# Patient Record
Sex: MALE | Race: WHITE | NOT HISPANIC OR LATINO | Employment: FULL TIME | URBAN - METROPOLITAN AREA
[De-identification: names, ages, dates, MRNs, and addresses within clinical notes are randomized per-mention and may not be internally consistent; named-entity substitution may affect disease eponyms.]

---

## 2021-08-16 ENCOUNTER — OFFICE VISIT (OUTPATIENT)
Dept: FAMILY MEDICINE CLINIC | Facility: CLINIC | Age: 26
End: 2021-08-16

## 2021-08-16 VITALS
WEIGHT: 124.2 LBS | RESPIRATION RATE: 12 BRPM | BODY MASS INDEX: 15.13 KG/M2 | DIASTOLIC BLOOD PRESSURE: 90 MMHG | SYSTOLIC BLOOD PRESSURE: 146 MMHG | OXYGEN SATURATION: 99 % | HEIGHT: 76 IN | TEMPERATURE: 98.7 F | HEART RATE: 85 BPM

## 2021-08-16 DIAGNOSIS — L72.3 SEBACEOUS CYST: ICD-10-CM

## 2021-08-16 DIAGNOSIS — N50.89 TESTICULAR MASS: ICD-10-CM

## 2021-08-16 DIAGNOSIS — S83.004A DISLOCATION OF RIGHT PATELLA, INITIAL ENCOUNTER: ICD-10-CM

## 2021-08-16 DIAGNOSIS — N52.9 ERECTILE DYSFUNCTION, UNSPECIFIED ERECTILE DYSFUNCTION TYPE: Primary | ICD-10-CM

## 2021-08-16 DIAGNOSIS — K62.5 RECTAL BLEEDING: ICD-10-CM

## 2021-08-16 PROCEDURE — 99204 OFFICE O/P NEW MOD 45 MIN: CPT | Performed by: PHYSICIAN ASSISTANT

## 2021-08-16 RX ORDER — SILDENAFIL 100 MG/1
50-100 TABLET, FILM COATED ORAL DAILY PRN
Qty: 30 TABLET | Refills: 0 | Status: SHIPPED | OUTPATIENT
Start: 2021-08-16

## 2021-08-16 NOTE — PROGRESS NOTES
Chief Complaint   Patient presents with   • Testicle Pain     No pain but there is a lump   • Sore Throat     Tonsilitits concerns   • Erectile Dysfunction       HPI     Jacob Trevizo is a pleasant 26 y.o. male who presents for initial visit.     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 He does not have a PCP.  He noticed lump below left testicle last week. Denies pain or change in size. Some tenderness to the touch. Denies dysuria, urethral discharge. New sexual partner 3 months ago, unprotected. Denies hx of STIs or prior partners. Denies family hx testicular cancer. Has difficulty maintaining erection which has been chronic.     He reports having anal protrusion after BM, sometimes bleeding that typically resolves after pushing back in. Denies rectal pain. Denies constipation, straining. No family hx colon/rectal cancer, colon polyps. Chronic problem for 12-13 years.     Lump on back present for 8-10 yrs. Has not changed in size and is not painful.     Dislocated right kneecap last year. It has happened a couple times when he was a teenager.     BP elevated today. Nervous about coming in. Unaware of Fhx or personal hx hypertension.     Occasional beer. No drug use or significant tobacco use.   3 brothers - healthy. Father/mother - unknown.   No surgeries.     History reviewed. No pertinent past medical history.    History reviewed. No pertinent surgical history.    History reviewed. No pertinent family history.    Social History     Socioeconomic History   • Marital status: Single     Spouse name: Not on file   • Number of children: Not on file   • Years of education: Not on file   • Highest education level: Not on file   Tobacco Use   • Smoking status: Former Smoker     Types: Cigarettes     Quit date: 8/16/2018     Years since quitting: 3.0   • Smokeless tobacco: Never Used   Vaping Use   • Vaping Use: Never used   Substance and Sexual Activity   • Alcohol use: Yes      Alcohol/week: 1.0 standard drinks     Types: 1 Cans of beer per week   • Drug use: Never   • Sexual activity: Yes       No Known Allergies    ROS    Review of Systems   Constitutional: Negative for appetite change, chills, diaphoresis, fatigue, fever and unexpected weight loss.   Eyes: Negative for blurred vision and visual disturbance.   Respiratory: Negative for shortness of breath.    Cardiovascular: Negative for chest pain.   Gastrointestinal: Positive for anal bleeding. Negative for abdominal pain, constipation, diarrhea and rectal pain.   Endocrine: Negative for polydipsia and polyuria.   Genitourinary: Positive for erectile dysfunction and testicular pain. Negative for dysuria, genital sores and scrotal swelling.   Musculoskeletal: Positive for arthralgias.   Skin: Positive for skin lesions.   Neurological: Negative for dizziness and headache.   Psychiatric/Behavioral: Negative for sleep disturbance, suicidal ideas and depressed mood. The patient is not nervous/anxious.        Vitals:    08/16/21 1447   BP: 146/90   Pulse: 85   Resp: 12   Temp: 98.7 °F (37.1 °C)   SpO2: 99%     Body mass index is 15.12 kg/m².      Current Outpatient Medications:   •  sildenafil (Viagra) 100 MG tablet, Take 0.5-1 tablets by mouth Daily As Needed for Erectile Dysfunction. Take 1 hour prior to intercourse, Disp: 30 tablet, Rfl: 0    PE    Physical Exam  Vitals reviewed.   Constitutional:       General: He is not in acute distress.     Appearance: He is well-developed.   HENT:      Head: Normocephalic and atraumatic.   Eyes:      Conjunctiva/sclera: Conjunctivae normal.   Cardiovascular:      Rate and Rhythm: Normal rate and regular rhythm.      Heart sounds: Normal heart sounds. No murmur heard.     Pulmonary:      Effort: Pulmonary effort is normal.      Breath sounds: Normal breath sounds.   Genitourinary:     Penis: Circumcised.       Testes:         Right: Mass or tenderness not present.         Left: Mass or tenderness not  present.      Comments: Pea sized firm nodule associated with left lower epididymis, nontender. No testicular mass palpated  Musculoskeletal:      Cervical back: Normal range of motion.      Right knee: Normal. No deformity or effusion. Normal range of motion. No tenderness.   Skin:     General: Skin is warm and dry.          Neurological:      Mental Status: He is alert.      Gait: Gait normal.   Psychiatric:         Speech: Speech normal.         Behavior: Behavior normal.          A/P    Problem List Items Addressed This Visit     None      Visit Diagnoses     Erectile dysfunction, unspecified erectile dysfunction type    -  Primary  -Order testosterone, trial sildenafil    Relevant Orders    Testosterone    Testicular mass      -Left epididymal mass, most likely cyst, will order ultrasound for further evaluation  -Counseled patient to call or return if this increases in size or worsens    Relevant Orders    US Scrotum & Testicles    Dislocation of right patella, initial encounter      -Home exercises provided  -Declines physical therapy at this time      Rectal bleeding      -Counseled patient on need for colonoscopy and colorectal surgery follow-up  -He declines at this time due to having no insurance.  Recommended patient notify me when he has insurance in place for referral      Sebaceous cyst      -Reassured benign, not inflamed          Plan of care was reviewed with patient at the conclusion of today's visit. Education was provided regarding diagnoses, management, prescribed or recommended OTC products, and the importance of compliance with follow-up appointments. The patient was counseled regarding the risks, benefits, and possible side-effects of treatment. I advised the patient to keep me informed of any acute changes in their status including new, worsening, or persistent symptoms. Patient expresses understanding and agreement with the management plan.        BRONSON Mayorga

## 2021-08-16 NOTE — PATIENT INSTRUCTIONS
Patellar Dislocation and Subluxation, Phase I Rehab  Ask your health care provider which exercises are safe for you. Do exercises exactly as told by your health care provider and adjust them as directed. It is normal to feel mild stretching, pulling, tightness, or discomfort as you do these exercises. Stop right away if you feel sudden pain or your pain gets worse. Do not begin these exercises until told by your health care provider.  Stretching and range-of-motion exercise  This exercise warms up your muscles and joints and improves the movement and flexibility of your knee. The exercise also helps to relieve pain, numbness, and tingling.  Knee flexion, active-assisted  This is an exercise in which you lie on your back and use your healthy leg to help pull (flex) your injured knee closer to you. This is called active-assisted flexion.  1. Lie on your back with both knees straight. If this causes back discomfort, bend your healthy knee so your foot is flat on the floor.  2. Slowly slide your left / right heel back toward your buttocks as far as you can without feeling pain.  3. Hook your healthy leg to the top of your left / right shin and pull back to gently help your knee bend further. Do not force your knee to bend.  4. Hold for __________ seconds.  5. Slowly slide your left / right heel back to the starting position.  Repeat __________ times. Complete this exercise __________ times a day.  Strengthening exercises  These exercises build strength and endurance in your knee. Endurance is the ability to use your muscles for a long time, even after they get tired. If told by your health care provider, wear your brace while you do these exercises.  Quadriceps, isometric  This exercise stretches the muscles in front of your thigh (quadriceps) without moving your knee joint (isometric).  1. Lie on your back with your left / right leg extended and your other knee bent. If told by your health care provider, put a small  pillow or rolled towel under your knee.  2. Slowly tense the muscles in the front of your left / right thigh. You should see your knee cap slide up toward your hip or see increased dimpling just above the knee. This motion will push the back of your knee toward the floor.  3. For ___________ seconds, hold the muscle as tight as you can without increasing your pain.  4. Relax the muscles slowly and completely.  Repeat __________ times. Complete this exercise __________ times a day.  Straight leg raises  This exercise stretches the muscles in front of your thigh (quadriceps) and the muscles that move your hips (hip flexors).  1. Lie on your back with your left / right leg extended and your other knee bent.  2. Tense the muscles in the front of your left / right thigh. You should see your kneecap slide up or see increased dimpling just above your knee. Your thigh may even shake a bit.  3. Keep these muscles tight as you raise your leg 4-6 inches (10-15 cm) off the floor.  4. Hold for __________ seconds.  5. Keep these muscles tense as you lower your leg.  6. Relax the muscles slowly and completely.  Repeat __________ times. Complete this exercise __________ times a day.  Straight leg raises  This exercise strengthens the muscles that rotate the leg at the hip and move it away from your body (hip abductors).  1. Lie on your side with your left / right leg in the top position. Lie so your head, shoulder, knee, and hip line up. You may bend your lower knee to help you keep your balance.  2. Roll your hips slightly forward so your hips are stacked directly over each other and your left / right knee is facing forward.  3. Leading with your heel, lift your top leg 4-6 inches (10-15 cm). You should feel the muscles in your outer hip lifting.  ? Do not let your foot drift forward.  ? Do not let your knee roll toward the ceiling.  4. Hold this position for __________ seconds.  5. Slowly lower your leg to the starting  position.  6. Let your muscles relax completely.  Repeat __________ times. Complete this exercise __________ times a day.  Face-down straight leg raises  This exercise stretches the muscles that move your hips away from the front of the pelvis (hip extensors).  1. Lie on your abdomen on a firm surface. You can put a pillow under your hips if that is more comfortable.  2. Tense the muscles in your buttocks and lift your left / right leg about 4-6 inches (10-15 cm). Keep your knee straight as you lift your leg.  3. Hold this position for __________ seconds.  4. Slowly lower your leg to the starting position.  5. Let your leg relax completely.  Repeat __________ times. Complete this exercise __________ times a day.  Side-lying straight leg raises  This exercise is sometimes called hip adductor exercise. This is an exercise in which you lie on your side, bend your top leg to put your foot on the floor, and raise your straight lower leg.  1. Lie on your side with your left / right leg in the bottom position. Lie so your head, shoulder, knee, and hip line up.  2. Bend your top leg and place that foot in front of or behind your other leg to help you keep your balance.  3. Roll your hips slightly forward so your hips are stacked directly over each other and your knee is facing forward.  4. Tense the muscles in your inner thigh and lift your bottom leg 4-6 inches (10-15 cm).  5. Hold this position for __________ seconds.  6. Slowly lower your leg to the starting position.  7. Let your muscles relax completely.  Repeat __________ times. Complete this exercise __________ times a day.  This information is not intended to replace advice given to you by your health care provider. Make sure you discuss any questions you have with your health care provider.  Document Revised: 04/14/2020 Document Reviewed: 10/08/2019  Elsevier Patient Education © 2021 [employer]

## 2021-08-31 ENCOUNTER — HOSPITAL ENCOUNTER (OUTPATIENT)
Dept: ULTRASOUND IMAGING | Facility: HOSPITAL | Age: 26
Discharge: HOME | End: 2021-08-31
Admitting: PHYSICIAN ASSISTANT

## 2021-08-31 DIAGNOSIS — N50.89 TESTICULAR MASS: ICD-10-CM

## 2021-08-31 PROCEDURE — 76870 US EXAM SCROTUM: CPT

## 2021-09-03 ENCOUNTER — TELEPHONE (OUTPATIENT)
Dept: FAMILY MEDICINE CLINIC | Facility: CLINIC | Age: 26
End: 2021-09-03

## 2021-09-03 NOTE — TELEPHONE ENCOUNTER
Called and spoke with pt. Informed pt of Scrotum and Testicle ultrasound. Pt verbalized understanding and stated he does not want a referral at this time but will visit it at a later date once his insurance kicks in.    ----- Message from BRONSON Mayorga sent at 9/3/2021  8:25 AM EDT -----  Please notify patient of ultrasound results showed prominent blood vessels identified on the left which are most likely the area of concern. There is no suspicious finding.  This is most likely a varicocele (dilated vein) which is benign but can adversely impact fertility and cause pain. Treatment is usually surgical. If he would like follow-up with a urologist, I would be happy to order the referral.

## 2022-07-28 ENCOUNTER — LAB (OUTPATIENT)
Dept: LAB | Facility: HOSPITAL | Age: 27
End: 2022-07-28

## 2022-07-28 ENCOUNTER — OFFICE VISIT (OUTPATIENT)
Dept: FAMILY MEDICINE CLINIC | Facility: CLINIC | Age: 27
End: 2022-07-28

## 2022-07-28 VITALS
DIASTOLIC BLOOD PRESSURE: 78 MMHG | BODY MASS INDEX: 33.97 KG/M2 | OXYGEN SATURATION: 99 % | SYSTOLIC BLOOD PRESSURE: 130 MMHG | HEIGHT: 76 IN | WEIGHT: 279 LBS | HEART RATE: 96 BPM

## 2022-07-28 DIAGNOSIS — Z00.00 PREVENTATIVE HEALTH CARE: Primary | ICD-10-CM

## 2022-07-28 DIAGNOSIS — Z00.00 PREVENTATIVE HEALTH CARE: ICD-10-CM

## 2022-07-28 DIAGNOSIS — S83.005D DISLOCATION OF LEFT PATELLA, SUBSEQUENT ENCOUNTER: ICD-10-CM

## 2022-07-28 DIAGNOSIS — N52.9 ERECTILE DYSFUNCTION, UNSPECIFIED ERECTILE DYSFUNCTION TYPE: ICD-10-CM

## 2022-07-28 DIAGNOSIS — E66.09 CLASS 1 OBESITY DUE TO EXCESS CALORIES WITHOUT SERIOUS COMORBIDITY WITH BODY MASS INDEX (BMI) OF 33.0 TO 33.9 IN ADULT: ICD-10-CM

## 2022-07-28 DIAGNOSIS — I86.1 LEFT VARICOCELE: ICD-10-CM

## 2022-07-28 DIAGNOSIS — L72.3 INFLAMED SEBACEOUS CYST: ICD-10-CM

## 2022-07-28 LAB
ALBUMIN SERPL-MCNC: 4.9 G/DL (ref 3.5–5.2)
ALBUMIN/GLOB SERPL: 1.6 G/DL
ALP SERPL-CCNC: 88 U/L (ref 39–117)
ALT SERPL W P-5'-P-CCNC: 22 U/L (ref 1–41)
ANION GAP SERPL CALCULATED.3IONS-SCNC: 13.9 MMOL/L (ref 5–15)
AST SERPL-CCNC: 17 U/L (ref 1–40)
BILIRUB SERPL-MCNC: 0.4 MG/DL (ref 0–1.2)
BILIRUB UR QL STRIP: NEGATIVE
BUN SERPL-MCNC: 16 MG/DL (ref 6–20)
BUN/CREAT SERPL: 12.5 (ref 7–25)
CALCIUM SPEC-SCNC: 9.8 MG/DL (ref 8.6–10.5)
CHLORIDE SERPL-SCNC: 101 MMOL/L (ref 98–107)
CHOLEST SERPL-MCNC: 187 MG/DL (ref 0–200)
CLARITY UR: CLEAR
CO2 SERPL-SCNC: 23.1 MMOL/L (ref 22–29)
COLOR UR: YELLOW
CREAT SERPL-MCNC: 1.28 MG/DL (ref 0.76–1.27)
DEPRECATED RDW RBC AUTO: 35.8 FL (ref 37–54)
EGFRCR SERPLBLD CKD-EPI 2021: 78.7 ML/MIN/1.73
ERYTHROCYTE [DISTWIDTH] IN BLOOD BY AUTOMATED COUNT: 12.2 % (ref 12.3–15.4)
GLOBULIN UR ELPH-MCNC: 3 GM/DL
GLUCOSE SERPL-MCNC: 94 MG/DL (ref 65–99)
GLUCOSE UR STRIP-MCNC: NEGATIVE MG/DL
HBV SURFACE AB SER RIA-ACNC: NORMAL
HBV SURFACE AG SERPL QL IA: NORMAL
HCT VFR BLD AUTO: 45.3 % (ref 37.5–51)
HCV AB SER DONR QL: NORMAL
HDLC SERPL-MCNC: 35 MG/DL (ref 40–60)
HGB BLD-MCNC: 15.8 G/DL (ref 13–17.7)
HGB UR QL STRIP.AUTO: NEGATIVE
HIV1+2 AB SER QL: NORMAL
KETONES UR QL STRIP: NEGATIVE
LDLC SERPL CALC-MCNC: 130 MG/DL (ref 0–100)
LDLC/HDLC SERPL: 3.67 {RATIO}
LEUKOCYTE ESTERASE UR QL STRIP.AUTO: NEGATIVE
MCH RBC QN AUTO: 28.6 PG (ref 26.6–33)
MCHC RBC AUTO-ENTMCNC: 34.9 G/DL (ref 31.5–35.7)
MCV RBC AUTO: 82.1 FL (ref 79–97)
NITRITE UR QL STRIP: NEGATIVE
PH UR STRIP.AUTO: 6 [PH] (ref 5–8)
PLATELET # BLD AUTO: 350 10*3/MM3 (ref 140–450)
PMV BLD AUTO: 9.5 FL (ref 6–12)
POTASSIUM SERPL-SCNC: 4.7 MMOL/L (ref 3.5–5.2)
PROT SERPL-MCNC: 7.9 G/DL (ref 6–8.5)
PROT UR QL STRIP: NEGATIVE
RBC # BLD AUTO: 5.52 10*6/MM3 (ref 4.14–5.8)
SODIUM SERPL-SCNC: 138 MMOL/L (ref 136–145)
SP GR UR STRIP: 1.02 (ref 1–1.03)
TESTOST SERPL-MCNC: 348 NG/DL (ref 249–836)
TRIGL SERPL-MCNC: 118 MG/DL (ref 0–150)
TSH SERPL DL<=0.05 MIU/L-ACNC: 2.42 UIU/ML (ref 0.27–4.2)
UROBILINOGEN UR QL STRIP: NORMAL
VLDLC SERPL-MCNC: 22 MG/DL (ref 5–40)
WBC NRBC COR # BLD: 7.11 10*3/MM3 (ref 3.4–10.8)

## 2022-07-28 PROCEDURE — 86706 HEP B SURFACE ANTIBODY: CPT

## 2022-07-28 PROCEDURE — 81003 URINALYSIS AUTO W/O SCOPE: CPT

## 2022-07-28 PROCEDURE — 84403 ASSAY OF TOTAL TESTOSTERONE: CPT

## 2022-07-28 PROCEDURE — 86592 SYPHILIS TEST NON-TREP QUAL: CPT

## 2022-07-28 PROCEDURE — 99395 PREV VISIT EST AGE 18-39: CPT | Performed by: PHYSICIAN ASSISTANT

## 2022-07-28 PROCEDURE — 80050 GENERAL HEALTH PANEL: CPT

## 2022-07-28 PROCEDURE — 86803 HEPATITIS C AB TEST: CPT

## 2022-07-28 PROCEDURE — 80061 LIPID PANEL: CPT

## 2022-07-28 PROCEDURE — 99213 OFFICE O/P EST LOW 20 MIN: CPT | Performed by: PHYSICIAN ASSISTANT

## 2022-07-28 PROCEDURE — G0432 EIA HIV-1/HIV-2 SCREEN: HCPCS

## 2022-07-28 PROCEDURE — 87340 HEPATITIS B SURFACE AG IA: CPT

## 2022-07-28 NOTE — PROGRESS NOTES
Reason for visit    Jacob Trevizo is a 26 y.o. male who presents for annual comprehensive exam.    Chief Complaint   Patient presents with   • Annual Exam   • Cyst     Over the course of 10 years comes and goes. This recent occurrence is painful        HPI     Here for physical.   He is an .  He states a cyst has been painful and swollen for the past 2 weeks.   He still has some knee pain after his left knee cap dislocated partially and popped back in a few weeks ago. He has a history of patellar dislocation in his right knee in high school.     Diet/Physical activity:  -He reports a little too much fast food but an overall healthy diet  -He does cardio 3 times weekly for 30 minutes, some weight lifting  -Weight has been stable per his report    Immunizations:  -He had 2 COVID vaccines, no booster. He had COVID in January  -Overdue for tetanus vaccine    Cancer screening:   -No known family history of cancer    Depression: PHQ-2 Depression Screening  -Negative    Substance use:  -Occasional cigar use, not for a few years  -Denies regular alcohol and recreational drug use    Sexual health:  -Denies new sexual partners in the past year  -Not currently sexually active    AAA screen:  -Not indicated    Dental/vision/skin:  -Overdue for dental and eye exams    History reviewed. No pertinent past medical history.    History reviewed. No pertinent surgical history.    History reviewed. No pertinent family history.    Social History     Socioeconomic History   • Marital status: Single   Tobacco Use   • Smoking status: Light Tobacco Smoker     Packs/day: 0.03     Years: 5.00     Pack years: 0.15     Types: Cigars     Start date: 2/10/2016     Last attempt to quit: 2021     Years since quittin.0   • Smokeless tobacco: Never Used   • Tobacco comment: An occasional cigar but non for over a year now   Vaping Use   • Vaping Use: Never used   Substance and Sexual Activity   • Alcohol use: Yes      Alcohol/week: 1.0 standard drink     Types: 1 Cans of beer per week   • Drug use: Never   • Sexual activity: Not Currently       No Known Allergies    ROS    Review of Systems   Constitutional: Negative for appetite change, diaphoresis, fatigue, fever and unexpected weight loss.   HENT: Negative for congestion, hearing loss, sore throat, swollen glands and trouble swallowing.    Eyes: Negative for blurred vision and visual disturbance.   Respiratory: Negative for cough and shortness of breath.    Cardiovascular: Negative for chest pain.   Gastrointestinal: Negative for abdominal pain, blood in stool, constipation, diarrhea and GERD.   Endocrine: Negative for cold intolerance, heat intolerance and polydipsia.   Genitourinary: Negative for difficulty urinating, dysuria, hematuria, nocturia and testicular pain.   Musculoskeletal: Positive for arthralgias (knees).   Skin: Positive for skin lesions. Negative for rash.   Allergic/Immunologic: Negative for environmental allergies.   Neurological: Negative for dizziness, syncope, numbness and headache.   Hematological: Negative for adenopathy.   Psychiatric/Behavioral: Negative for sleep disturbance and depressed mood. The patient is not nervous/anxious.        Vitals:    07/28/22 1030   BP: 130/78   Pulse:    SpO2:      Body mass index is 33.96 kg/m².      Current Outpatient Medications:   •  sildenafil (Viagra) 100 MG tablet, Take 0.5-1 tablets by mouth Daily As Needed for Erectile Dysfunction. Take 1 hour prior to intercourse, Disp: 30 tablet, Rfl: 0  •  cephalexin (Keflex) 500 MG capsule, Take 1 capsule by mouth 2 (Two) Times a Day for 10 days., Disp: 20 capsule, Rfl: 0  •  Tdap (BOOSTRIX) 5-2.5-18.5 LF-MCG/0.5 injection, Inject 0.5 mL into the appropriate muscle as directed by prescriber 1 (One) Time for 1 dose., Disp: 0.5 mL, Rfl: 0    PE    Physical Exam  Vitals reviewed.   Constitutional:       General: He is not in acute distress.     Appearance: He is  well-developed. He is obese.   HENT:      Head: Normocephalic and atraumatic.      Right Ear: Hearing and tympanic membrane normal.      Left Ear: Hearing and tympanic membrane normal.      Mouth/Throat:      Mouth: Mucous membranes are moist.      Dentition: Normal dentition.      Pharynx: Oropharynx is clear.   Eyes:      Extraocular Movements: Extraocular movements intact.      Conjunctiva/sclera: Conjunctivae normal.      Pupils: Pupils are equal, round, and reactive to light.      Comments:      Neck:      Thyroid: No thyroid mass or thyromegaly.      Vascular: No carotid bruit.   Cardiovascular:      Rate and Rhythm: Normal rate and regular rhythm.      Heart sounds: No murmur heard.    No friction rub.   Pulmonary:      Effort: Pulmonary effort is normal.      Breath sounds: Normal breath sounds.   Chest:   Breasts:      Right: No supraclavicular adenopathy.      Left: No supraclavicular adenopathy.       Abdominal:      General: Bowel sounds are normal. There is no abdominal bruit.      Palpations: Abdomen is soft. There is no mass.      Tenderness: There is no abdominal tenderness.   Musculoskeletal:         General: Normal range of motion.      Cervical back: Normal range of motion and neck supple.   Lymphadenopathy:      Cervical: No cervical adenopathy.      Upper Body:      Right upper body: No supraclavicular adenopathy.      Left upper body: No supraclavicular adenopathy.   Skin:     General: Skin is warm and dry.      Findings: No rash.      Nails: There is no clubbing.             Comments: No suspicious nevi   Neurological:      Mental Status: He is alert.      Gait: Gait normal.      Deep Tendon Reflexes: Reflexes normal.   Psychiatric:         Speech: Speech normal.         Behavior: Behavior normal.         A/P    Problem List Items Addressed This Visit        Endocrine and Metabolic    Class 1 obesity due to excess calories without serious comorbidity with body mass index (BMI) of 33.0 to 33.9  in adult  -BMI is >= 30 and <35. (Class 1 Obesity). The following options were offered after discussion;: exercise counseling/recommendations and nutrition counseling/recommendations      Other Visit Diagnoses     Preventative health care    -  Primary  -Counseled patient regarding cancer screening, immunizations, healthy lifestyle, diet, maintaining a healthy weight, and exercise.   -Annual dental and eye exams were encouraged.  -Tdap order provided  -Check labs, STI screening  -RTC in 1 year for physical or sooner if needed    Relevant Orders    CBC (No Diff)    Comprehensive Metabolic Panel    Lipid Panel    TSH Rfx On Abnormal To Free T4    Urinalysis With Culture If Indicated - Urine, Clean Catch    Hepatitis C Antibody    Hepatitis B Surface Antibody    Hepatitis B Surface Antigen    HIV-1 / O / 2 Ag / Antibody 4th Generation    RPR    Left varicocele      -Hx of prominent vessels on u/s 9/1/22  -He is interested in having children at some point in the future. Discussed affect on fertility  -Refer to urology    Relevant Orders    Ambulatory Referral to Urology    Dislocation of left patella, subsequent encounter     -Refer to physical therapy     Relevant Orders    Ambulatory Referral to Physical Therapy Evaluate and treat    Inflamed sebaceous cyst      -Rx keflex to cover for infection  -Needs excision. Refer to dermatology    Relevant Orders    Ambulatory Referral to Dermatology          Plan of care was reviewed with patient at the conclusion of today's visit. Education was provided regarding diagnoses, management, treatment plan, and the importance of keeping follow-up appointments. The patient was counseled regarding the risks, benefits, and possible side-effects of treatment. Patient and/or family expresses understanding and agreement with the management plan.        BRONSON Mayorga

## 2022-07-29 LAB — RPR SER QL: NORMAL

## 2022-08-17 ENCOUNTER — TREATMENT (OUTPATIENT)
Dept: PHYSICAL THERAPY | Facility: CLINIC | Age: 27
End: 2022-08-17

## 2022-08-17 DIAGNOSIS — S83.002S PATELLAR SUBLUXATION, LEFT, SEQUELA: Primary | ICD-10-CM

## 2022-08-17 PROCEDURE — 97161 PT EVAL LOW COMPLEX 20 MIN: CPT | Performed by: PHYSICAL THERAPIST

## 2022-08-17 NOTE — PROGRESS NOTES
Physical Therapy Initial Evaluation and Plan of Care        Patient: Jacob Trevizo   : 1995  Diagnosis/ICD-10 Code:  Patellar subluxation, left, sequela [S83.002S]  Referring practitioner: BRONSON Mayorga  Date of Initial Visit: 2022  Today's Date: 2022  Patient seen for 1 sessions           Subjective Questionnaire: LEFS: 72/80      Subjective Evaluation    History of Present Illness  Mechanism of injury: L>R patellar subluxations. He reports history of multiple patellar subluxations in high school but he dislocated his R knee 2 years ago. He reports subluxing his L patella about 1 month ago while tripping on an uneven surface. He reports significant improvement in symptoms over the past week.    CLOF: avoiding weight lifting for legs since injuring L LE, squat pivot transfer for car transfers, pain after prolonged kneeling, hesitant with L LE on stairs, has not attempted running      Patient Occupation: electrical engineering Pain  Current pain ratin  At best pain ratin  At worst pain rating: 3  Progression: improved    Patient Goals  Patient goals for therapy: increased strength and independence with ADLs/IADLs  Patient goal: feel safe/confident playing softball           Objective          Tenderness   Left Knee   Tenderness in the medial patella.     Active Range of Motion   Left Knee   Flexion: 135 degrees     Right Knee   Flexion: 135 degrees     Additional Active Range of Motion Details  Significant but symmetrical B knee hyperextension    Passive Range of Motion   Left Knee   Flexion: WFL  Extension: WFL    Right Knee   Flexion: WFL  Extension: WFL    Patellar Mobility   Left Knee Patellar tendons within functional limits include the medial, lateral, superior and inferior.     Right Knee Patellar tendons within functional limits include the medial, lateral, superior and inferior.     Patellar Static Positioning   Left Knee: lateral tilt  Right Knee: lateral  tilt    Strength/Myotome Testing     Left Hip   Planes of Motion   Flexion: 5  Extension: 4  Abduction: 5  External rotation: 5    Right Hip   Planes of Motion   Flexion: 5  Extension: 4  Right hip abductors strength: 5-  External rotation: 5    Left Knee   Flexion: 5  Left knee extension strength: 5-, pain.  Quadriceps contraction: fair    Right Knee   Flexion: 5  Extension: 5  Quadriceps contraction: good    Tests     Additional Tests Details  Mild laxity but good end feel present with B varus/valgus and anterior/posterior drawer tests    Ambulation     Ambulation: Stairs     Additional Stairs Ambulation Details  Ascending: L hip hike when stepping up w/L  Descending: turns body to the R    Comments   Toes in          Assessment & Plan     Assessment  Impairments: abnormal gait, abnormal muscle firing, activity intolerance, impaired physical strength, lacks appropriate home exercise program, pain with function and safety issue  Functional Limitations: standing and stooping  Assessment details: Patient presents with L>R knee pain and recurrent patellar subluxations/dislocations secondary to generalized laxity and possible shallow patellar grooves. Patient demonstrates good overall motor control with knee position but has decreased L quad activation and B gluteal weakness.     Barriers to therapy: recurrent nature raises suspicion of shallow patellar groove  Prognosis: good    Goals  Plan Goals: Short term goals (3weeks)  1. Patient to be compliant with initial HEP.  2. Patient to demonstrate LEFS >76/80.    Long Term goals (6 weeks)  1. Patient to demonstrate full and pain free L quad strength.  2. Patient to demonstrate full functional squat without knee pain.  3. Patient to ascend and descend a full flight of stairs without compensation or knee pain.  4. Patient to demonstrate independence with home exercise program.  5. Patient to improve LEFS to greater than or equal to 79/80.  6. Patient to report PLOF with  car transfers.      Plan  Therapy options: will be seen for skilled therapy services  Planned modality interventions: thermotherapy (hydrocollator packs), TENS, cryotherapy and dry needling  Planned therapy interventions: manual therapy, neuromuscular re-education, soft tissue mobilization, strengthening, stretching, therapeutic activities, joint mobilization, home exercise program, functional ROM exercises, balance/weight-bearing training, abdominal trunk stabilization and body mechanics training  Frequency: 1x week  Duration in weeks: 6  Treatment plan discussed with: patient  Plan details: 1x/week for 6 weeks        Timed:  Manual Therapy:    0     mins  02780;  Therapeutic Exercise:    0     mins  50006;     Neuromuscular Josh:    0    mins  61403;    Therapeutic Activity:     0     mins  68089;     Gait Trainin     mins  89066;     Ultrasound:     0     mins  67774;    Electrical Stimulation:    0     mins  99632 ( );    Untimed:  Electrical Stimulation:    0     mins  49227 ( );  Mechanical Traction:    0     mins  43703;     Timed Treatment:   0   mins   Total Treatment:     60   mins    PT SIGNATURE: Sree Benton PT   License Number: [id number]  DATE TREATMENT INITIATED: 2022    Initial Certification  Certification Period: 11/15/2022  I certify that the therapy services are furnished while this patient is under my care.  The services outlined above are required by this patient, and will be reviewed every 90 days.     PHYSICIAN: Ryan Manuel PA      DATE:     Please sign and return via fax to [phone].. Thank you, [facility].

## 2022-08-24 ENCOUNTER — TREATMENT (OUTPATIENT)
Dept: PHYSICAL THERAPY | Facility: CLINIC | Age: 27
End: 2022-08-24

## 2022-08-24 DIAGNOSIS — S83.002S PATELLAR SUBLUXATION, LEFT, SEQUELA: Primary | ICD-10-CM

## 2022-08-24 PROCEDURE — 97110 THERAPEUTIC EXERCISES: CPT | Performed by: PHYSICAL THERAPIST

## 2022-08-24 PROCEDURE — 97112 NEUROMUSCULAR REEDUCATION: CPT | Performed by: PHYSICAL THERAPIST

## 2022-08-24 NOTE — PROGRESS NOTES
Physical Therapy Daily Progress Note        Patient: Jacob Trevizo   : 1995  Diagnosis/ICD-10 Code:  Patellar subluxation, left, sequela [S83.002S]  Referring practitioner: No ref. provider found  Date of Initial Visit: Type: THERAPY  Noted: 2022  Today's Date: 2022  Patient seen for 2 sessions           Patient reports: improved quad contraction and improved squat depth. Mild R knee tightness after doing squats.      Objective   See Exercise, Manual, and Modality Logs for complete treatment.       Assessment/Plan  Improved depth noted with squats compared with eval. Advanced closed chain strengthening and stability for glutes, HS, and quad with good tolerance. Patient fatigued after treatment, especially with HS strengthening, but minimal pain reported throughout treatment. Added single leg balance to gain control in prep for SL closed chain strengthening in future visits. Patient reporting greater ease with some functional activities he reported problems with at evaluation.            Timed:  Manual Therapy:    0     mins  70205;  Therapeutic Exercise:    38     mins  82508;     Neuromuscular Josh:   10    mins  82982;    Therapeutic Activity:     0     mins  63274;     Gait Trainin     mins  40045;     Ultrasound:     0     mins  69851;    Electrical Stimulation:    0     mins  67965 ( );    Untimed:  Electrical Stimulation:    0     mins  34795 ( );  Mechanical Traction:    0     mins  88127;     Timed Treatment:   48   mins   Total Treatment:     48   mins    Sree Benton PT  Physical Therapist

## 2022-08-31 ENCOUNTER — TREATMENT (OUTPATIENT)
Dept: PHYSICAL THERAPY | Facility: CLINIC | Age: 27
End: 2022-08-31

## 2022-08-31 DIAGNOSIS — S83.002S PATELLAR SUBLUXATION, LEFT, SEQUELA: Primary | ICD-10-CM

## 2022-08-31 PROCEDURE — 97530 THERAPEUTIC ACTIVITIES: CPT | Performed by: PHYSICAL THERAPIST

## 2022-08-31 PROCEDURE — 97110 THERAPEUTIC EXERCISES: CPT | Performed by: PHYSICAL THERAPIST

## 2022-08-31 NOTE — PROGRESS NOTES
Physical Therapy Daily Progress Note        Patient: Jacob Trevizo   : 1995  Diagnosis/ICD-10 Code:  Patellar subluxation, left, sequela [S83.002S]  Referring practitioner: BRONSON Mayorga  Date of Initial Visit: Type: THERAPY  Noted: 2022  Today's Date: 2022  Patient seen for 3 sessions           Patient reports: his knee is feeling better with car transfers and stairs, though he continues to feel pressure at times. Held some exercises this past week due to removal of a cyst on his mid back.      Objective   See Exercise, Manual, and Modality Logs for complete treatment.       Assessment/Plan  Progressed to asymmetrical CKC strengthening without pain. He required form correction of lunges to prevent knee valgus collapse, pelvic drop, and hip IR, but was fairly successful in corrections.             Timed:  Manual Therapy:    0     mins  04131;  Therapeutic Exercise:    25     mins  71338;     Neuromuscular Josh:   0    mins  40271;    Therapeutic Activity:     10     mins  44183;     Gait Trainin     mins  77012;     Ultrasound:     0     mins  66915;    Electrical Stimulation:    0     mins  99079 ( );    Untimed:  Electrical Stimulation:    0     mins  13382 ( );  Mechanical Traction:    0     mins  53366;     Timed Treatment:   35   mins   Total Treatment:     35   mins    Sree Benton PT  Physical Therapist

## 2022-09-07 ENCOUNTER — TREATMENT (OUTPATIENT)
Dept: PHYSICAL THERAPY | Facility: CLINIC | Age: 27
End: 2022-09-07

## 2022-09-07 DIAGNOSIS — S83.002S PATELLAR SUBLUXATION, LEFT, SEQUELA: Primary | ICD-10-CM

## 2022-09-07 PROCEDURE — 97112 NEUROMUSCULAR REEDUCATION: CPT | Performed by: PHYSICAL THERAPIST

## 2022-09-07 PROCEDURE — 97530 THERAPEUTIC ACTIVITIES: CPT | Performed by: PHYSICAL THERAPIST

## 2022-09-07 PROCEDURE — 97110 THERAPEUTIC EXERCISES: CPT | Performed by: PHYSICAL THERAPIST

## 2022-09-07 NOTE — PROGRESS NOTES
Physical Therapy Daily Progress Note        Patient: Jacob Trevizo   : 1995  Diagnosis/ICD-10 Code:  Patellar subluxation, left, sequela [S83.002S]  Referring practitioner: BRONSON Mayorga  Date of Initial Visit: Type: THERAPY  Noted: 2022  Today's Date: 2022  Patient seen for 4 sessions           Patient reports: increased L knee soreness after more walking the past few days. Reports glute and hamstring soreness after last visit and with transition to single leg RDLs in HEP.      Objective   See Exercise, Manual, and Modality Logs for complete treatment.       Assessment/Plan  Continued single leg CKC retraining and asymmetrical CKC activities, with improved lunge control noted. He was inconsistent with knee control during lateral heel taps but reported no pain. Overall, no pain reported throughout treatment. Added hip thrust to HEP.            Timed:  Manual Therapy:    0     mins  40853;  Therapeutic Exercise:    30     mins  89058;     Neuromuscular Josh:   11    mins  94671;    Therapeutic Activity:     10     mins  02388;     Gait Trainin     mins  64987;     Ultrasound:     0     mins  42999;    Electrical Stimulation:    0     mins  02696 ( );    Untimed:  Electrical Stimulation:    0     mins  36382 ( );  Mechanical Traction:    0     mins  30790;     Timed Treatment:   51   mins   Total Treatment:     51   mins    Sree Benton PT  Physical Therapist

## 2022-09-14 ENCOUNTER — TREATMENT (OUTPATIENT)
Dept: PHYSICAL THERAPY | Facility: CLINIC | Age: 27
End: 2022-09-14

## 2022-09-14 DIAGNOSIS — S83.002S PATELLAR SUBLUXATION, LEFT, SEQUELA: Primary | ICD-10-CM

## 2022-09-14 PROCEDURE — 97140 MANUAL THERAPY 1/> REGIONS: CPT | Performed by: PHYSICAL THERAPIST

## 2022-09-14 PROCEDURE — 97112 NEUROMUSCULAR REEDUCATION: CPT | Performed by: PHYSICAL THERAPIST

## 2022-09-14 PROCEDURE — 97110 THERAPEUTIC EXERCISES: CPT | Performed by: PHYSICAL THERAPIST

## 2022-09-14 NOTE — PROGRESS NOTES
Physical Therapy Daily Progress Note        Patient: Jacob Trevizo   : 1995  Diagnosis/ICD-10 Code:  Patellar subluxation, left, sequela [S83.002S]  Referring practitioner: BRONSON Mayorga  Date of Initial Visit: Type: THERAPY  Noted: 2022  Today's Date: 2022  Patient seen for 5 sessions           Patient reports: no new subluxations but he continues to experience discomfort descending stairs and with car transfers.      Objective   See Exercise, Manual, and Modality Logs for complete treatment.       Assessment/Plan  With palpation of B proximal insertions of patellar tendons replicating part of patient's recent pain, cross friction and eccentrics to the L patellar tendon were trialed. Patient reported less distal knee pain descending stairs after cross friction.     Will assess patient response NV and determine need for additional visits. Will assess baseball swing in prep for softball.        Timed:  Manual Therapy:    10     mins  71355;  Therapeutic Exercise:    28     mins  63921;     Neuromuscular Josh:   10    mins  15551;    Therapeutic Activity:     0     mins  54621;     Gait Trainin     mins  30667;     Ultrasound:     0     mins  79677;    Electrical Stimulation:    0     mins  90337 ( );    Untimed:  Electrical Stimulation:    0     mins  22007 ( );  Mechanical Traction:    0     mins  05639;     Timed Treatment:   48   mins   Total Treatment:     48   mins    Sree Benton PT  Physical Therapist

## 2022-09-21 ENCOUNTER — TREATMENT (OUTPATIENT)
Dept: PHYSICAL THERAPY | Facility: CLINIC | Age: 27
End: 2022-09-21

## 2022-09-21 DIAGNOSIS — S83.002S PATELLAR SUBLUXATION, LEFT, SEQUELA: Primary | ICD-10-CM

## 2022-09-21 PROCEDURE — 97110 THERAPEUTIC EXERCISES: CPT | Performed by: PHYSICAL THERAPIST

## 2022-09-21 PROCEDURE — 97530 THERAPEUTIC ACTIVITIES: CPT | Performed by: PHYSICAL THERAPIST

## 2022-09-21 NOTE — PROGRESS NOTES
Physical Therapy Daily Progress Note and Discharge Summary        Patient: Jacob Trevizo   : 1995  Diagnosis/ICD-10 Code:  Patellar subluxation, left, sequela [S83.002S]  Referring practitioner: BRONSON Mayorga  Date of Initial Visit: Type: THERAPY  Noted: 2022  Today's Date: 2022  Patient seen for 6 sessions           Patient reports: less knee pain with car transfers since last visit, which he attributes to patellar tendon interventions.     Subjective Questionnaire: LEFS: 76/80        Subjective Evaluation  CLOF: resumed weight lifting for legs, mild knee pain for car transfers, no pain after prolonged kneeling, no pain on stairs, has not attempted running    Pain  Current pain ratin  At best pain ratin  At worst pain ratin    Objective          Active Range of Motion   Left Knee   Normal active range of motion    Right Knee   Normal active range of motion    Passive Range of Motion   Left Knee   Normal passive range of motion    Right Knee   Normal passive range of motion    Strength/Myotome Testing     Left Hip   Planes of Motion   Extension: 4    Right Hip   Planes of Motion   Extension: 4    Left Knee   Extension: 5  Quadriceps contraction: fair    Right Knee   Quadriceps contraction: good      See Exercise, Manual, and Modality Logs for complete treatment.     Goals  Plan Goals: Short term goals (3weeks)  1. Patient to be compliant with initial HEP. Met   2. Patient to demonstrate LEFS >76/80. Met     Long Term goals (6 weeks)  1. Patient to demonstrate full and pain free L quad strength. Met   2. Patient to demonstrate full functional squat without knee pain. Met   3. Patient to ascend and descend a full flight of stairs without compensation or knee pain. 90% met  4. Patient to demonstrate independence with home exercise program. Met   5. Patient to improve LEFS to greater than or equal to 79/80. 50% met   6. Patient to report PLOF with car transfers. 75%    Discharge  Summary  Patient demonstrates significant improvement in symptoms and BADLs since starting PT. L quad strength continues to improve though he continues to demonstrate mild compensatory strategies on stairs and with jogging, with less eccentric quad control and decreased stance time on L LE. These deficits should continue to improve with independent HEP, with which he has demonstrated consistency. Patient advised that patellofemoral stability braces would be good options if he begins to experience functional instability again            Timed:  Manual Therapy:    0     mins  59275;  Therapeutic Exercise:    30     mins  32654;     Neuromuscular Josh:   0    mins  95917;    Therapeutic Activity:     10     mins  91561;     Gait Trainin     mins  32895;     Ultrasound:     0     mins  07846;    Electrical Stimulation:    0     mins  35927 ( );    Untimed:  Electrical Stimulation:    0     mins  97201 ( );  Mechanical Traction:    0     mins  38026;     Timed Treatment:   40   mins   Total Treatment:     40   mins    Sree Benton PT  Physical Therapist

## 2022-10-11 ENCOUNTER — OFFICE VISIT (OUTPATIENT)
Dept: UROLOGY | Facility: CLINIC | Age: 27
End: 2022-10-11

## 2022-10-11 VITALS — BODY MASS INDEX: 33.97 KG/M2 | HEIGHT: 76 IN | WEIGHT: 279 LBS

## 2022-10-11 DIAGNOSIS — N50.3 EPIDIDYMAL CYST: Primary | ICD-10-CM

## 2022-10-11 PROCEDURE — 99204 OFFICE O/P NEW MOD 45 MIN: CPT | Performed by: UROLOGY

## 2022-10-11 NOTE — PROGRESS NOTES
Office Visit New Urology      Patient Name: Jacob Trevizo  : 1995   MRN: [id number]     Chief Complaint: Epididymal cyst, varicocele    Referring Provider: Ryan Manuel PA    History of Present Illness: Jacob Trevizo is a 27 y.o. male who presents to Urology today for evaluation of epididymal cyst and varicocele demonstrated on recent scrotal ultrasound.  The patient reported concern of possible testicular mass, was evaluated by primary care physician and scrotal ultrasound ordered.  Ultrasound has demonstrated no intratesticular lesion.  The patient was identified to have small right epididymal cyst.  He was also identified to have subclinical varicocele, no change with provocative maneuvers such as Valsalva.    Today the patient denies any testicular pain or discomfort.  He reports that indication for presentation was based upon concern on self-examination.  He denies lower urinary tract symptoms.  Denies hematuria.  Denies history of urinary tract infection.  Denies past urologic evaluation including instrumentation or procedure.      Subjective      Review of System: Review of Systems   Constitutional: Negative for chills, fatigue, fever and unexpected weight change.   HENT: Negative for sore throat.    Eyes: Negative for visual disturbance.   Respiratory: Negative for cough, chest tightness and shortness of breath.    Cardiovascular: Negative for chest pain and leg swelling.   Gastrointestinal: Negative for blood in stool, constipation, diarrhea, nausea, rectal pain and vomiting.   Genitourinary: Negative for decreased urine volume, difficulty urinating, dysuria, enuresis, flank pain, frequency, genital sores, hematuria and urgency.   Musculoskeletal: Negative for back pain and joint swelling.   Skin: Negative for rash and wound.   Neurological: Negative for seizures, speech difficulty, weakness and headaches.   Psychiatric/Behavioral: Negative for confusion, sleep disturbance and suicidal  "ideas. The patient is not nervous/anxious.       I have reviewed the ROS documented by my clinical staff, updated appropriately and I agree. Edward Sanchez MD    Past Medical History: History reviewed. No pertinent past medical history.    Past Surgical History: History reviewed. No pertinent surgical history.    Family History: History reviewed. No pertinent family history.    Social History:   Social History     Socioeconomic History   • Marital status: Single   Tobacco Use   • Smoking status: Light Smoker     Types: Cigars   • Smokeless tobacco: Never   • Tobacco comments:     An occasional cigar but non for over a year now   Vaping Use   • Vaping Use: Never used   Substance and Sexual Activity   • Alcohol use: Yes     Alcohol/week: 2.0 standard drinks     Types: 2 Cans of beer per week   • Drug use: Never   • Sexual activity: Not Currently       Medications:     Current Outpatient Medications:   •  sildenafil (Viagra) 100 MG tablet, Take 0.5-1 tablets by mouth Daily As Needed for Erectile Dysfunction. Take 1 hour prior to intercourse, Disp: 30 tablet, Rfl: 0    Allergies:   No Known Allergies        Objective     Physical Exam:   Vital Signs:   Vitals:    10/11/22 1234   Weight: 127 kg (279 lb)   Height: 193 cm (75.98\")   PainSc: 0-No pain     Body mass index is 33.98 kg/m².     Physical Exam  Vitals and nursing note reviewed.   Constitutional:       Appearance: Normal appearance.   HENT:      Head: Normocephalic and atraumatic.   Cardiovascular:      Comments: Well perfused  Pulmonary:      Effort: Pulmonary effort is normal.   Abdominal:      General: Abdomen is flat.      Palpations: Abdomen is soft.   Musculoskeletal:         General: Normal range of motion.   Skin:     General: Skin is warm and dry.   Neurological:      General: No focal deficit present.      Mental Status: He is alert and oriented to person, place, and time. Mental status is at baseline.   Psychiatric:         Mood and Affect: Mood normal. "         Behavior: Behavior normal.         Thought Content: Thought content normal.         Judgment: Judgment normal.         Genitourinary  Penis: circumcised penis, glans normal, no penile discharge.  No rashes/lesions.    Testes: descended bilaterally, no masses, nontender to palpation.  Small right epididymal cyst on palpation.  Nontender.  No appreciable/palpable varicocele bilaterally.  Remainder of scrotal contents normal. No hernia appreciated.      Labs:   Brief Urine Lab Results  (Last result in the past 365 days)      Color   Clarity   Blood   Leuk Est   Nitrite   Protein   CREAT   Urine HCG        07/28/22 1020 Yellow   Clear   Negative   Negative   Negative   Negative                      Lab Results   Component Value Date    GLUCOSE 94 07/28/2022    CALCIUM 9.8 07/28/2022     07/28/2022    K 4.7 07/28/2022    CO2 23.1 07/28/2022     07/28/2022    BUN 16 07/28/2022    CREATININE 1.28 (H) 07/28/2022    BCR 12.5 07/28/2022    ANIONGAP 13.9 07/28/2022       Lab Results   Component Value Date    WBC 7.11 07/28/2022    HGB 15.8 07/28/2022    HCT 45.3 07/28/2022    MCV 82.1 07/28/2022     07/28/2022       Images: Scrotal US /2021  FINDINGS: The right testicle measures 4.6 x 2.4 x 3.3 cm. There is no  underlying mass or lesion seen within the right testicle. Good color  flow is present. There is a tiny hydrocele identified on the right. The  right epididymis measures 0.7 x 1.1 x 1.4 cm. Several cysts identified  within the right epididymis the largest measuring 5 mm. Findings  suggesting epididymal head cyst or spermatoceles.     The left testicle measures 4.6 x 2.4 x 3.4 cm. No underlying mass or  lesion with good color flow. The left epididymis measures 1.2 x 1.0 x  1.4 cm. There is a tiny hydrocele identified on the left. Prominent  vasculature identified on the left with no significant increase with  Valsalva. The prominent vessels on the left do correlate to the palpable  area of  interest felt by the patient.     IMPRESSION:  Prominent vessels identified on the left with no significant  increase seen with Valsalva. The prominent vessels on the left do  correlate to the palpable area of interest felt by the patient. No  testicular mass or lesion. Good blood flow is seen to the testicles  bilaterally.    Measures:   Tobacco:   Jacob Trevizo  reports that he has been smoking cigars. He has never used smokeless tobacco.. I have educated him on the risk of diseases from using tobacco products.  Assessment / Plan      Assessment/Plan:   27 y.o. male is here today for evaluation after scrotal ultrasound performed due to patient concern after testicular self-examination.  Patient was seen by primary care physician with concern for possible testicular lesion.  Scrotal ultrasound was performed demonstrating small right epididymal cyst, subclinical varicocele.  Today on physical examination there are no concerning findings. The patient does have a small palpable epididymal cyst.  We have discussed small epididymal cyst, asymptomatic and of a size that does not warrant intervention.   No intratesticular lesion palpated on exam, no intratesticular lesion on ultrasound.  Today we have discussed ultrasound findings.  He is asymptomatic.  Denies lower urinary tract symptoms.  We have continue to encourage self examination but have reviewed his ultrasound and demonstrated that there are no concerning findings at this time.  He will continue to follow with primary care physician, alert primary care physician with any changes or concerns.  He may follow-up with urology as needed.    Diagnoses and all orders for this visit:    1. Epididymal cyst (Primary)       Follow Up:   Return if symptoms worsen or fail to improve.    I spent approximately 45 minutes providing clinical care for this patient; including review of patient's chart and provider documentation, face to face time spent with patient in examination  room (obtaining history, performing physical exam, discussing diagnosis and management options), placing orders, and completing patient documentation.     Edward Sanchez MD  [facility] [place]

## 2022-10-14 PROBLEM — N50.3 EPIDIDYMAL CYST: Status: ACTIVE | Noted: 2022-10-14
